# Patient Record
Sex: MALE | URBAN - METROPOLITAN AREA
[De-identification: names, ages, dates, MRNs, and addresses within clinical notes are randomized per-mention and may not be internally consistent; named-entity substitution may affect disease eponyms.]

---

## 2022-07-25 ENCOUNTER — OFFICE VISIT (OUTPATIENT)
Dept: URBAN - METROPOLITAN AREA SURGERY CENTER 31 | Facility: SURGERY CENTER | Age: 52
End: 2022-07-25
Payer: COMMERCIAL

## 2022-07-25 DIAGNOSIS — Z12.11 COLON CANCER SCREENING: ICD-10-CM

## 2022-07-25 DIAGNOSIS — Z83.71 FAMILY HISTORY OF COLON POLYPS: ICD-10-CM

## 2022-07-25 PROCEDURE — 45378 DIAGNOSTIC COLONOSCOPY: CPT | Performed by: INTERNAL MEDICINE

## 2022-07-25 PROCEDURE — G8907 PT DOC NO EVENTS ON DISCHARG: HCPCS | Performed by: INTERNAL MEDICINE

## 2022-07-26 ENCOUNTER — OFFICE VISIT (OUTPATIENT)
Dept: URBAN - METROPOLITAN AREA SURGERY CENTER 31 | Facility: SURGERY CENTER | Age: 52
End: 2022-07-26

## 2024-07-22 ENCOUNTER — DASHBOARD ENCOUNTERS (OUTPATIENT)
Age: 54
End: 2024-07-22

## 2024-07-22 ENCOUNTER — LAB OUTSIDE AN ENCOUNTER (OUTPATIENT)
Dept: URBAN - METROPOLITAN AREA CLINIC 128 | Facility: CLINIC | Age: 54
End: 2024-07-22

## 2024-07-22 ENCOUNTER — OFFICE VISIT (OUTPATIENT)
Dept: URBAN - METROPOLITAN AREA CLINIC 128 | Facility: CLINIC | Age: 54
End: 2024-07-22
Payer: COMMERCIAL

## 2024-07-22 VITALS
OXYGEN SATURATION: 97 % | WEIGHT: 207 LBS | TEMPERATURE: 97.9 F | HEART RATE: 73 BPM | HEIGHT: 69 IN | DIASTOLIC BLOOD PRESSURE: 88 MMHG | BODY MASS INDEX: 30.66 KG/M2 | SYSTOLIC BLOOD PRESSURE: 120 MMHG

## 2024-07-22 DIAGNOSIS — R79.89 ABNORMAL LFTS: ICD-10-CM

## 2024-07-22 PROCEDURE — 99204 OFFICE O/P NEW MOD 45 MIN: CPT | Performed by: STUDENT IN AN ORGANIZED HEALTH CARE EDUCATION/TRAINING PROGRAM

## 2024-07-22 RX ORDER — ROSUVASTATIN CALCIUM 20 MG/1
1 TABLET TABLET, COATED ORAL ONCE A DAY
Status: ACTIVE | COMMUNITY

## 2024-07-22 RX ORDER — AMLODIPINE BESYLATE 5 MG/1
1 TABLET TABLET ORAL ONCE A DAY
Status: ACTIVE | COMMUNITY

## 2024-07-22 RX ORDER — ESCITALOPRAM OXALATE 5 MG/1
1 TABLET TABLET ORAL ONCE A DAY
Status: ACTIVE | COMMUNITY

## 2024-07-22 RX ORDER — LOSARTAN POTASSIUM AND HYDROCHLOROTHIAZIDE 100; 25 MG/1; MG/1
1 TABLET TABLET, FILM COATED ORAL ONCE A DAY
Status: ACTIVE | COMMUNITY

## 2024-07-22 NOTE — HPI-TODAY'S VISIT:
7/22/2024:  Rehan: 53 yo M presents for concerns of elevated LFTs as a referral from his PCP.  Dr. Donis has not yet sent labs or US report.   Labs: T.bili 1.7 with D. bili 0.3, AST/ALT/ALP 65/145/82, Plt 190, HbA1c 6.2% Co-morbidities: Pre-diabetic Prior imaging and additional work-up: US was normal.   UTD with screening colonoscopy in July 2022 with Dr. Simms.  Diverticulosis.  Recall colonoscopy in 5 years, due in 2027.

## 2024-08-07 ENCOUNTER — TELEPHONE ENCOUNTER (OUTPATIENT)
Dept: URBAN - METROPOLITAN AREA CLINIC 19 | Facility: CLINIC | Age: 54
End: 2024-08-07

## 2024-08-08 ENCOUNTER — LAB OUTSIDE AN ENCOUNTER (OUTPATIENT)
Dept: URBAN - METROPOLITAN AREA CLINIC 19 | Facility: CLINIC | Age: 54
End: 2024-08-08

## 2024-09-09 ENCOUNTER — OFFICE VISIT (OUTPATIENT)
Dept: URBAN - METROPOLITAN AREA CLINIC 128 | Facility: CLINIC | Age: 54
End: 2024-09-09

## 2024-10-01 ENCOUNTER — OFFICE VISIT (OUTPATIENT)
Dept: URBAN - METROPOLITAN AREA CLINIC 128 | Facility: CLINIC | Age: 54
End: 2024-10-01

## 2024-10-01 RX ORDER — ROSUVASTATIN CALCIUM 20 MG/1
1 TABLET TABLET, COATED ORAL ONCE A DAY
Status: ACTIVE | COMMUNITY

## 2024-10-01 RX ORDER — ESCITALOPRAM OXALATE 5 MG/1
1 TABLET TABLET ORAL ONCE A DAY
Status: ACTIVE | COMMUNITY

## 2024-10-01 RX ORDER — LOSARTAN POTASSIUM AND HYDROCHLOROTHIAZIDE 100; 25 MG/1; MG/1
1 TABLET TABLET, FILM COATED ORAL ONCE A DAY
Status: ACTIVE | COMMUNITY

## 2024-10-01 RX ORDER — AMLODIPINE BESYLATE 5 MG/1
1 TABLET TABLET ORAL ONCE A DAY
Status: ACTIVE | COMMUNITY

## 2024-10-01 NOTE — HPI-TODAY'S VISIT:
10/1/2024:  Rehan: 54 yo M with elevated LFTs.  UTD with colonoscopy due in 2027.  Repeat labs obtained.  MILA elevated.  AST/ALT elevated.  ALT > AST.  Otherwise normal labs including platelets, hb, INR, iron panel, viral hep labs and autoimmune labs.  Imaging with doppler pending.   ========== 7/22/2024:  Rehan: 55 yo M presents for concerns of elevated LFTs as a referral from his PCP.  Dr. Donis has not yet sent labs or US report.   Labs: T.bili 1.7 with D. bili 0.3, AST/ALT/ALP 65/145/82, Plt 190, HbA1c 6.2% Co-morbidities: Pre-diabetic Prior imaging and additional work-up: US was normal.   UTD with screening colonoscopy in July 2022 with Dr. Simms.  Diverticulosis.  Recall colonoscopy in 5 years, due in 2027. Plan:  Autoimmune labs repeated, viral hep labs and MRI possibly.  Lifestyle changes recommended.  RTC in 3 months. ==============

## 2024-10-25 ENCOUNTER — LAB OUTSIDE AN ENCOUNTER (OUTPATIENT)
Dept: URBAN - METROPOLITAN AREA CLINIC 19 | Facility: CLINIC | Age: 54
End: 2024-10-25

## 2024-10-27 LAB
ABSOLUTE BASOPHIL COUNT: 0.05
ABSOLUTE EOSINOPHIL COUNT: 0.41
ABSOLUTE IMMATURE GRANULOCYTE  COUNT: 0.03
ABSOLUTE LYMPHOCYTE COUNT: 1.39
ABSOLUTE MONOCYTE COUNT: 0.98
ABSOLUTE NEUTROPHIL COUNT (ANC): 6.1
ABSOLUTE NRBC  COUNT: 0
BASOPHIL AUTO: 1
EOS AUTO: 5
HCT: 44.1
HGB: 15.2
IMMATURE GRANULOCYTES AUTO: 0.3
INR: 1
LYMPH AUTO: 16
MCH: 29.9
MCHC: 34.5
MCV: 86.6
MONO AUTO: 11
MPV: 11.8
NEUTRO AUTO: 68
NRBC AUTO: 0
PARTIAL THROMBOPLASTIN TIME: 35.7
PERFORMING LAB: (no result)
PLATELETS: 210
PT: 13.5
RBC: 5.09
RDW: 12.9
WBC: 9

## 2024-11-01 ENCOUNTER — LAB OUTSIDE AN ENCOUNTER (OUTPATIENT)
Dept: URBAN - METROPOLITAN AREA CLINIC 19 | Facility: CLINIC | Age: 54
End: 2024-11-01

## 2024-11-03 LAB
ABSOLUTE BASOPHIL COUNT: 0.06
ABSOLUTE EOSINOPHIL COUNT: 0.4
ABSOLUTE IMMATURE GRANULOCYTE  COUNT: 0.04
ABSOLUTE LYMPHOCYTE COUNT: 1.45
ABSOLUTE MONOCYTE COUNT: 0.67
ABSOLUTE NEUTROPHIL COUNT (ANC): 4.05
ABSOLUTE NRBC  COUNT: 0
BASOPHIL AUTO: 1
EOS AUTO: 6
HCT: 44.2
HGB: 14.9
IMMATURE GRANULOCYTES AUTO: 0.6
INR: 1
LYMPH AUTO: 22
MCH: 29.2
MCHC: 33.7
MCV: 86.7
MONO AUTO: 10
MPV: 11.2
NEUTRO AUTO: 61
NRBC AUTO: 0
PARTIAL THROMBOPLASTIN TIME: 36.4
PERFORMING LAB: (no result)
PLATELETS: 233
PT: 13.6
RBC: 5.1
RDW: 12.8
WBC: 6.7

## 2024-11-12 ENCOUNTER — TELEPHONE ENCOUNTER (OUTPATIENT)
Dept: URBAN - METROPOLITAN AREA CLINIC 19 | Facility: CLINIC | Age: 54
End: 2024-11-12

## 2024-11-18 ENCOUNTER — LAB OUTSIDE AN ENCOUNTER (OUTPATIENT)
Dept: URBAN - METROPOLITAN AREA CLINIC 128 | Facility: CLINIC | Age: 54
End: 2024-11-18

## 2024-11-18 ENCOUNTER — OFFICE VISIT (OUTPATIENT)
Dept: URBAN - METROPOLITAN AREA CLINIC 128 | Facility: CLINIC | Age: 54
End: 2024-11-18
Payer: COMMERCIAL

## 2024-11-18 ENCOUNTER — TELEPHONE ENCOUNTER (OUTPATIENT)
Dept: URBAN - METROPOLITAN AREA CLINIC 19 | Facility: CLINIC | Age: 54
End: 2024-11-18

## 2024-11-18 VITALS
SYSTOLIC BLOOD PRESSURE: 116 MMHG | TEMPERATURE: 97.7 F | DIASTOLIC BLOOD PRESSURE: 76 MMHG | OXYGEN SATURATION: 96 % | HEIGHT: 69 IN | HEART RATE: 79 BPM | WEIGHT: 202.4 LBS | BODY MASS INDEX: 29.98 KG/M2

## 2024-11-18 DIAGNOSIS — R79.89 ABNORMAL LFTS: ICD-10-CM

## 2024-11-18 DIAGNOSIS — K75.81 NASH (NONALCOHOLIC STEATOHEPATITIS): ICD-10-CM

## 2024-11-18 PROBLEM — 442685003: Status: ACTIVE | Noted: 2024-11-18

## 2024-11-18 PROCEDURE — 99214 OFFICE O/P EST MOD 30 MIN: CPT | Performed by: STUDENT IN AN ORGANIZED HEALTH CARE EDUCATION/TRAINING PROGRAM

## 2024-11-18 RX ORDER — LOSARTAN POTASSIUM AND HYDROCHLOROTHIAZIDE 100; 25 MG/1; MG/1
1 TABLET TABLET, FILM COATED ORAL ONCE A DAY
Status: ACTIVE | COMMUNITY

## 2024-11-18 RX ORDER — ESCITALOPRAM OXALATE 5 MG/1
1 TABLET TABLET ORAL ONCE A DAY
Status: ACTIVE | COMMUNITY

## 2024-11-18 RX ORDER — AMLODIPINE BESYLATE 5 MG/1
1 TABLET TABLET ORAL ONCE A DAY
Status: ACTIVE | COMMUNITY

## 2024-11-18 RX ORDER — ROSUVASTATIN CALCIUM 20 MG/1
1 TABLET TABLET, COATED ORAL ONCE A DAY
Status: ACTIVE | COMMUNITY

## 2024-11-18 NOTE — HPI-TODAY'S VISIT:
11/18/2024:  Shreenath: 54 yo M with elevated LFTs.  UTD with colonoscopy due in 2027.  Repeat labs obtained.  MILA elevated.  AST/ALT elevated.  ALT > AST.  Otherwise normal labs including platelets, hb, INR, iron panel, viral hep labs and autoimmune labs.  Patient underwent CT guided biospy of the liver at [facility] department.  The biopsies showed: Steatosis with mild portal and lobular inflammation with balooning.  No fibrosis.  Chronic inflammation.  Rare neutrophils and eosinophils.  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 Unfortunately the patient developed sharp pain at the site of biospy resulting in his return to the ER at [facility].  A repeat CT showed multiple small enhancing lesions the largest of which was 2.3 cm on the imaging.  This was presumbed to be the site of the recent biopsy on the report.  Diverticulosis and mildly enlarged prostate gland and what was presumed to be benign renal cysts noted.  Presumably since the CT biopsy and the CT from the ER were at the same facility, no new findings were noted.  Still on zepbound.  Doing jujitzu.  Discussed wt loss.   ========== 7/22/2024:  Shreenath: 53 yo M presents for concerns of elevated LFTs as a referral from his PCP.  Dr. Donis has not yet sent labs or US report.   Labs: T.bili 1.7 with D. bili 0.3, AST/ALT/ALP 65/145/82, Plt 190, HbA1c 6.2% Co-morbidities: Pre-diabetic Prior imaging and additional work-up: US was normal.   UTD with screening colonoscopy in July 2022 with Dr. Simms.  Diverticulosis.  Recall colonoscopy in 5 years, due in 2027. Plan:  Autoimmune labs repeated, viral hep labs and MRI possibly.  Lifestyle changes recommended.  RTC in 3 months. ==============

## 2025-05-12 ENCOUNTER — OFFICE VISIT (OUTPATIENT)
Dept: URBAN - METROPOLITAN AREA CLINIC 128 | Facility: CLINIC | Age: 55
End: 2025-05-12

## 2025-05-12 ENCOUNTER — LAB OUTSIDE AN ENCOUNTER (OUTPATIENT)
Dept: URBAN - METROPOLITAN AREA CLINIC 128 | Facility: CLINIC | Age: 55
End: 2025-05-12

## 2025-05-12 ENCOUNTER — TELEPHONE ENCOUNTER (OUTPATIENT)
Dept: URBAN - METROPOLITAN AREA CLINIC 19 | Facility: CLINIC | Age: 55
End: 2025-05-12

## 2025-05-12 RX ORDER — ROSUVASTATIN CALCIUM 20 MG/1
1 TABLET TABLET, COATED ORAL ONCE A DAY
Status: ACTIVE | COMMUNITY

## 2025-05-12 RX ORDER — LOSARTAN POTASSIUM AND HYDROCHLOROTHIAZIDE 100; 25 MG/1; MG/1
1 TABLET TABLET, FILM COATED ORAL ONCE A DAY
Status: ACTIVE | COMMUNITY

## 2025-05-12 RX ORDER — ESCITALOPRAM OXALATE 5 MG/1
1 TABLET TABLET ORAL ONCE A DAY
Status: ACTIVE | COMMUNITY

## 2025-05-12 RX ORDER — AMLODIPINE BESYLATE 5 MG/1
1 TABLET TABLET ORAL ONCE A DAY
Status: ACTIVE | COMMUNITY

## 2025-05-12 NOTE — HPI-OTHER HISTORIES
11/18/2024:  Rehan: 54 yo M with elevated LFTs.  UTD with colonoscopy due in 2027.  Repeat labs obtained.  MILA elevated.  AST/ALT elevated.  ALT > AST.  Otherwise normal labs including platelets, hb, INR, iron panel, viral hep labs and autoimmune labs.  Patient underwent CT guided biospy of the liver at [facility] department.  The biopsies showed: Steatosis with mild portal and lobular inflammation with balooning.  No fibrosis.  Chronic inflammation.  Rare neutrophils and eosinophils.  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 Unfortunately the patient developed sharp pain at the site of biospy resulting in his return to the ER at [facility].  A repeat CT showed multiple small enhancing lesions the largest of which was 2.3 cm on the imaging.  This was presumbed to be the site of the recent biopsy on the report.  Diverticulosis and mildly enlarged prostate gland and what was presumed to be benign renal cysts noted.  Presumably since the CT biopsy and the CT from the ER were at the same facility, no new findings were noted.  Still on zepbound.  Doing jujitzu.  Discussed wt loss.  Plan: Labs Q3 months with US every year.  F/U in GI clinic every 6 months.  Continue zepbound. ========== 7/22/2024:  Rehan: 53 yo M presents for concerns of elevated LFTs as a referral from his PCP.  Dr. Donis has not yet sent labs or US report.   Labs: T.bili 1.7 with D. bili 0.3, AST/ALT/ALP 65/145/82, Plt 190, HbA1c 6.2% Co-morbidities: Pre-diabetic Prior imaging and additional work-up: US was normal.   UTD with screening colonoscopy in July 2022 with Dr. Simms.  Diverticulosis.  Recall colonoscopy in 5 years, due in 2027. Plan:  Autoimmune labs repeated, viral hep labs and MRI possibly.  Lifestyle changes recommended.  RTC in 3 months. ==============

## 2025-06-17 ENCOUNTER — OFFICE VISIT (OUTPATIENT)
Dept: URBAN - METROPOLITAN AREA CLINIC 128 | Facility: CLINIC | Age: 55
End: 2025-06-17

## 2025-06-19 ENCOUNTER — OFFICE VISIT (OUTPATIENT)
Dept: URBAN - METROPOLITAN AREA CLINIC 19 | Facility: CLINIC | Age: 55
End: 2025-06-19

## 2025-06-19 RX ORDER — AMLODIPINE BESYLATE 5 MG/1
1 TABLET TABLET ORAL ONCE A DAY
Status: ACTIVE | COMMUNITY

## 2025-06-19 RX ORDER — LOSARTAN POTASSIUM AND HYDROCHLOROTHIAZIDE 100; 25 MG/1; MG/1
1 TABLET TABLET, FILM COATED ORAL ONCE A DAY
Status: ACTIVE | COMMUNITY

## 2025-06-19 RX ORDER — ROSUVASTATIN CALCIUM 20 MG/1
1 TABLET TABLET, COATED ORAL ONCE A DAY
Status: ACTIVE | COMMUNITY

## 2025-06-19 RX ORDER — ESCITALOPRAM OXALATE 5 MG/1
1 TABLET TABLET ORAL ONCE A DAY
Status: ACTIVE | COMMUNITY

## 2025-06-19 NOTE — HPI-TODAY'S VISIT:
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  Last imaging of the liver was in November 2024.

## 2025-06-19 NOTE — HPI-OTHER HISTORIES
11/18/2024:  Rehan: 56 yo M with elevated LFTs.  UTD with colonoscopy due in 2027.  Repeat labs obtained.  MILA elevated.  AST/ALT elevated.  ALT > AST.  Otherwise normal labs including platelets, hb, INR, iron panel, viral hep labs and autoimmune labs.  Patient underwent CT guided biospy of the liver at [facility] department.  The biopsies showed: Steatosis with mild portal and lobular inflammation with balooning.  No fibrosis.  Chronic inflammation.  Rare neutrophils and eosinophils.  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 Unfortunately the patient developed sharp pain at the site of biospy resulting in his return to the ER at [facility].  A repeat CT showed multiple small enhancing lesions the largest of which was 2.3 cm on the imaging.  This was presumbed to be the site of the recent biopsy on the report.  Diverticulosis and mildly enlarged prostate gland and what was presumed to be benign renal cysts noted.  Presumably since the CT biopsy and the CT from the ER were at the same facility, no new findings were noted.  Still on zepbound.  Doing jujitzu.  Discussed wt loss.  Plan: Labs Q3 months with US every year.  F/U in GI clinic every 6 months.  Continue zepbound. ========== 7/22/2024:  Rehan: 55 yo M presents for concerns of elevated LFTs as a referral from his PCP.  Dr. Donis has not yet sent labs or US report.   Labs: T.bili 1.7 with D. bili 0.3, AST/ALT/ALP 65/145/82, Plt 190, HbA1c 6.2% Co-morbidities: Pre-diabetic Prior imaging and additional work-up: US was normal.   UTD with screening colonoscopy in July 2022 with Dr. Simms.  Diverticulosis.  Recall colonoscopy in 5 years, due in 2027. Plan:  Autoimmune labs repeated, viral hep labs and MRI possibly.  Lifestyle changes recommended.  RTC in 3 months. ==============